# Patient Record
Sex: MALE | Race: BLACK OR AFRICAN AMERICAN | NOT HISPANIC OR LATINO | Employment: OTHER | URBAN - METROPOLITAN AREA
[De-identification: names, ages, dates, MRNs, and addresses within clinical notes are randomized per-mention and may not be internally consistent; named-entity substitution may affect disease eponyms.]

---

## 2020-07-21 ENCOUNTER — HOSPITAL ENCOUNTER (EMERGENCY)
Facility: CLINIC | Age: 30
Discharge: HOME | End: 2020-07-21
Attending: EMERGENCY MEDICINE | Admitting: EMERGENCY MEDICINE
Payer: MEDICAID

## 2020-07-21 VITALS
HEART RATE: 57 BPM | OXYGEN SATURATION: 98 % | SYSTOLIC BLOOD PRESSURE: 143 MMHG | RESPIRATION RATE: 12 BRPM | DIASTOLIC BLOOD PRESSURE: 90 MMHG

## 2020-07-21 DIAGNOSIS — R31.9 HEMATURIA, UNSPECIFIED TYPE: ICD-10-CM

## 2020-07-21 DIAGNOSIS — R36.9 URETHRAL DISCHARGE: ICD-10-CM

## 2020-07-21 DIAGNOSIS — R19.7 DIARRHEA, UNSPECIFIED TYPE: ICD-10-CM

## 2020-07-21 LAB
ALBUMIN UR-MCNC: NEGATIVE MG/DL
APPEARANCE UR: CLEAR
BACTERIA #/AREA URNS HPF: ABNORMAL /HPF
BASOPHILS # BLD AUTO: 0 10E9/L (ref 0–0.2)
BASOPHILS NFR BLD AUTO: 0.3 %
BILIRUB UR QL STRIP: NEGATIVE
COLOR UR AUTO: YELLOW
DIFFERENTIAL METHOD BLD: NORMAL
EOSINOPHIL # BLD AUTO: 0 10E9/L (ref 0–0.7)
EOSINOPHIL NFR BLD AUTO: 0.4 %
ERYTHROCYTE [DISTWIDTH] IN BLOOD BY AUTOMATED COUNT: 13.3 % (ref 10–15)
GLUCOSE UR STRIP-MCNC: NEGATIVE MG/DL
HCT VFR BLD AUTO: 45.8 % (ref 40–53)
HGB BLD-MCNC: 16.1 G/DL (ref 13.3–17.7)
HGB UR QL STRIP: NEGATIVE
IMM GRANULOCYTES # BLD: 0 10E9/L (ref 0–0.4)
IMM GRANULOCYTES NFR BLD: 0.3 %
KETONES UR STRIP-MCNC: NEGATIVE MG/DL
LEUKOCYTE ESTERASE UR QL STRIP: NEGATIVE
LYMPHOCYTES # BLD AUTO: 2.5 10E9/L (ref 0.8–5.3)
LYMPHOCYTES NFR BLD AUTO: 36.8 %
MCH RBC QN AUTO: 31.9 PG (ref 26.5–33)
MCHC RBC AUTO-ENTMCNC: 35.2 G/DL (ref 31.5–36.5)
MCV RBC AUTO: 91 FL (ref 78–100)
MONOCYTES # BLD AUTO: 0.6 10E9/L (ref 0–1.3)
MONOCYTES NFR BLD AUTO: 8.4 %
MUCOUS THREADS #/AREA URNS LPF: PRESENT /LPF
NEUTROPHILS # BLD AUTO: 3.7 10E9/L (ref 1.6–8.3)
NEUTROPHILS NFR BLD AUTO: 53.8 %
NITRATE UR QL: NEGATIVE
NRBC # BLD AUTO: 0 10*3/UL
NRBC BLD AUTO-RTO: 0 /100
PH UR STRIP: 6 PH (ref 5–7)
PLATELET # BLD AUTO: 292 10E9/L (ref 150–450)
RBC # BLD AUTO: 5.04 10E12/L (ref 4.4–5.9)
RBC #/AREA URNS AUTO: <1 /HPF (ref 0–2)
SOURCE: ABNORMAL
SP GR UR STRIP: 1.01 (ref 1–1.03)
SQUAMOUS #/AREA URNS AUTO: <1 /HPF (ref 0–1)
UROBILINOGEN UR STRIP-MCNC: NORMAL MG/DL (ref 0–2)
WBC # BLD AUTO: 6.9 10E9/L (ref 4–11)
WBC #/AREA URNS AUTO: 1 /HPF (ref 0–5)

## 2020-07-21 PROCEDURE — 96372 THER/PROPH/DIAG INJ SC/IM: CPT

## 2020-07-21 PROCEDURE — 25000128 H RX IP 250 OP 636: Performed by: EMERGENCY MEDICINE

## 2020-07-21 PROCEDURE — 36415 COLL VENOUS BLD VENIPUNCTURE: CPT

## 2020-07-21 PROCEDURE — 86780 TREPONEMA PALLIDUM: CPT | Performed by: EMERGENCY MEDICINE

## 2020-07-21 PROCEDURE — 25000125 ZZHC RX 250: Performed by: EMERGENCY MEDICINE

## 2020-07-21 PROCEDURE — 87591 N.GONORRHOEAE DNA AMP PROB: CPT | Performed by: EMERGENCY MEDICINE

## 2020-07-21 PROCEDURE — 85025 COMPLETE CBC W/AUTO DIFF WBC: CPT | Performed by: EMERGENCY MEDICINE

## 2020-07-21 PROCEDURE — 87491 CHLMYD TRACH DNA AMP PROBE: CPT | Performed by: EMERGENCY MEDICINE

## 2020-07-21 PROCEDURE — 99284 EMERGENCY DEPT VISIT MOD MDM: CPT | Mod: 25

## 2020-07-21 PROCEDURE — 86592 SYPHILIS TEST NON-TREP QUAL: CPT | Performed by: EMERGENCY MEDICINE

## 2020-07-21 PROCEDURE — 81001 URINALYSIS AUTO W/SCOPE: CPT | Performed by: EMERGENCY MEDICINE

## 2020-07-21 PROCEDURE — 86593 SYPHILIS TEST NON-TREP QUANT: CPT | Performed by: EMERGENCY MEDICINE

## 2020-07-21 RX ORDER — DOXYCYCLINE 100 MG/1
100 CAPSULE ORAL 2 TIMES DAILY
Qty: 20 CAPSULE | Refills: 0 | Status: SHIPPED | OUTPATIENT
Start: 2020-07-21 | End: 2020-07-31

## 2020-07-21 RX ADMIN — LIDOCAINE HYDROCHLORIDE 250 MG: 10 INJECTION, SOLUTION EPIDURAL; INFILTRATION; INTRACAUDAL; PERINEURAL at 19:52

## 2020-07-21 ASSESSMENT — ENCOUNTER SYMPTOMS
HEMATURIA: 1
DIARRHEA: 1
ABDOMINAL PAIN: 0
BLOOD IN STOOL: 0
FEVER: 0
RECTAL PAIN: 0
APPETITE CHANGE: 0
CHILLS: 0
DYSURIA: 0
NAUSEA: 0
VOMITING: 0
SHORTNESS OF BREATH: 0

## 2020-07-21 NOTE — ED AVS SNAPSHOT
Emergency Department  [address] [place] [id number]  Phone:  [phone]  Fax:  [phone]                                    Kartik Madera   MRN: [id number]    Department:   Emergency Department   Date of Visit:  7/21/2020           After Visit Summary Signature Page    I have received my discharge instructions, and my questions have been answered. I have discussed any challenges I see with this plan with the nurse or doctor.    ..........................................................................................................................................  Patient/Patient Representative Signature      ..........................................................................................................................................  Patient Representative Print Name and Relationship to Patient    ..................................................               ................................................  Date                                   Time    ..........................................................................................................................................  Reviewed by Signature/Title    ...................................................              ..............................................  Date                                               Time          22EPIC Rev 08/18

## 2020-07-22 LAB
C TRACH DNA SPEC QL NAA+PROBE: NEGATIVE
N GONORRHOEA DNA SPEC QL NAA+PROBE: NEGATIVE
RPR SER QL: REACTIVE
RPR SER-TITR: 8 {TITER}
SPECIMEN SOURCE: NORMAL
SPECIMEN SOURCE: NORMAL
T PALLIDUM AB SER QL: REACTIVE

## 2020-07-22 NOTE — ED PROVIDER NOTES
History   Chief Complaint:  Diarrhea    HPI   Kartik Madera is a 30 year old male, who just recently moved to [place] from [place], presenting to the ED for evaluation of diarrhea. The patient reports he had the onset of hematuria and diarrhea within the past 3-4 days. The patient states he had hematuria first, which demonstrated a thick clot like amount of blood coming from the penis. He does also mention some pus like contents as well. He notes he had pain with urination after sex, but has not had any dysuria since then. The patient denies any testicle soreness or buttocks pain. He does mention he has had this once in 2016, but they attributed it to a broken blood vessel. The patient also complains of diarrhea which he has been ongoing for about 2-3 days. He has about 3 diarrheal episodes a day which are water like in nature, but are not black or bloody. He denies any fever, nausea, vomiting, abdominal pain, or shaking chills. He has a good appetite. He has not lost sense of taste or smell. He denies any chest pain or shortness of breath.    Allergies:  No known drug allergies    Medications:    The patient is not currently taking any prescribed medications.    Past Medical History:    History reviewed.  No pertinent past medical history.    Past Surgical History:    History reviewed. No pertinent surgical history.    Family History:    History reviewed. No pertinent family history.     Social History:  Smoking status: Yes  Alcohol use: Yes  Presents to the ED alone    Review of Systems   Constitutional: Negative for appetite change, chills and fever.   Respiratory: Negative for shortness of breath.    Cardiovascular: Negative for chest pain.   Gastrointestinal: Positive for diarrhea. Negative for abdominal pain, blood in stool, nausea, rectal pain and vomiting.   Genitourinary: Positive for discharge and hematuria. Negative for dysuria, penile pain and testicular pain.   All other systems reviewed and are  negative.    Physical Exam     Patient Vitals for the past 24 hrs:   BP Pulse Resp SpO2   07/21/20 2030 (!) 143/90 57 -- 98 %   07/21/20 1905 -- -- 18 --   07/21/20 1903 -- -- -- 97 %   07/21/20 1902 (!) 164/108 98 -- --       Physical Exam  Constitutional: Heavy set black male, supine  HENT: No signs of trauma.   Eyes: EOM are normal. Pupils are equal, round, and reactive to light.   Neck: Normal range of motion. No JVD present. No cervical adenopathy.  Cardiovascular: Regular rhythm.  Exam reveals no gallop and no friction rub.    No murmur heard.  Pulmonary/Chest: Bilateral breath sounds normal. No wheezes, rhonchi or rales.  Abdominal: Soft. No tenderness. No rebound or guarding.   : Circumcised penis. No rash or discharge. Testes descended bilaterally, non tender, no inguinal adenopathy or lesion. Rectal area has no redness or bleeding.  Musculoskeletal: No edema. No tenderness.   Lymphadenopathy: No lymphadenopathy.   Neurological: Alert and oriented to person, place, and time. Normal strength. Coordination normal.   Skin: Skin is warm and dry. No rash noted. No erythema.     Emergency Department Course   Laboratory:  Laboratory findings were communicated with the patient who voiced understanding of the findings.    CBC: WBC 6.9, HGB 16.1,     UA: Bacteria Few, Mucous Present, WBC/HPF <1, RBC/HPF <1    Chlamydia trachomatis PCR: Pending   Neisseria gonorrhoea PCR: Pending  Treponema Abs w Reflex to RPR and Titer: Pending    Interventions:  1952: Rocephin 250 mg IM    Emergency Department Course:  Past medical records, nursing notes, and vitals reviewed.    1909 I performed an exam of the patient as documented above.     IV was inserted and blood was drawn for laboratory testing, results above.  The patient provided a urine sample here in the emergency department. This was sent for laboratory testing, findings above.    2010 I rechecked the patient and discussed the results of his workup thus far.      Findings and plan explained to the Patient. Patient discharged home with instructions regarding supportive care, medications, and reasons to return. The importance of close follow-up was reviewed.    I personally reviewed the laboratory results with the Patient and answered all related questions prior to discharge.     Impression & Plan   Medical Decision Making:  Kartik Madera is a 30 year old male who states about 3-4 days ago he noted some urethral bleeding after having sex. This has been occurring intermittently since. He states he had this happen one time a few years ago and they told him a small blood vessel had broken. He denies any pain on urination, however, when I asked him if he had any discharge from his penis he states yes. He denies any rectal pain other than he has been having loose stools over the past few days which have been water and has been sore when he wipes. He denies any hemorrhoids. He denies any abdominal pain, fever, or chills. On examination, his  exam is unremarkable. Labs were obtained, a CBC was normal, his urine is unremarkable. RPR and urethral swabs for GC and chlamydia were sent. Patient remains comfortable at this time. It is uncertain whether it is truly hematuria or urethral discharge that he is having. Hematology is good and I will treat him for possible urethral infection with a shot of Rocephin and 10 day course of doxycycline. His diarrhea should be treated symptomatically. He has not been around anyone with diarrhea nor has he ingested new foods or medications. I told him the antibiotics could make it worse and he should follow the diarrhea instructions and possible try Pepto-Bismol. It is possible that this is not urethral discharge, but indeed could be hematuria so he would need follow up for that. I referred him to Dr. Quintero and have asked him to make an appointment in the next 2-3 days. If symptoms worsen or recur come to the emergency department. This is unlikely  to be kidney stone or any intraabdominal infection or problem.      Diagnosis:    ICD-10-CM    1. Urethral discharge  R36.9    2. Diarrhea, unspecified type  R19.7    3. Hematuria, unspecified type  R31.9        Disposition:  Discharged to home.    Discharge Medications:  New Prescriptions    DOXYCYCLINE HYCLATE (VIBRAMYCIN) 100 MG CAPSULE    Take 1 capsule (100 mg) by mouth 2 times daily for 10 days       Scribe Disclosure:  Barrett ANDREW, am serving as a scribe at 7:09 PM on 7/21/2020 to document services personally performed by Ramón Mason MD based on my observations and the provider's statements to me.        Ramón Mason MD  07/21/20 2034

## 2020-07-24 ENCOUNTER — VIRTUAL VISIT (OUTPATIENT)
Dept: FAMILY MEDICINE | Facility: CLINIC | Age: 30
End: 2020-07-24
Payer: MEDICAID

## 2020-07-24 ENCOUNTER — TELEPHONE (OUTPATIENT)
Dept: FAMILY MEDICINE | Facility: CLINIC | Age: 30
End: 2020-07-24

## 2020-07-24 DIAGNOSIS — R31.9 HEMATURIA, UNSPECIFIED TYPE: ICD-10-CM

## 2020-07-24 DIAGNOSIS — Z11.3 SCREEN FOR STD (SEXUALLY TRANSMITTED DISEASE): ICD-10-CM

## 2020-07-24 DIAGNOSIS — A53.9 SYPHILIS: Primary | ICD-10-CM

## 2020-07-24 PROCEDURE — 99203 OFFICE O/P NEW LOW 30 MIN: CPT | Mod: GT | Performed by: INTERNAL MEDICINE

## 2020-07-24 PROCEDURE — 99207 ZZC NO CHARGE LOS: CPT | Performed by: INTERNAL MEDICINE

## 2020-07-24 NOTE — TELEPHONE ENCOUNTER
Called and spoke to patient - Nurse Only for Penicillin inj set up for upcoming Monday 27th at 1:30 pm, unable to make Lab appt for Monday, appt set up for Tuesday 28th at 3:15 pm.    Chelsie Tapia MA on 7/24/2020 at 4:53 PM

## 2020-07-24 NOTE — PROGRESS NOTES
"Kartik Madera is a 30 year old male who is being evaluated via a billable video visit.      The patient has been notified of following:     \"This video visit will be conducted via a call between you and your physician/provider. We have found that certain health care needs can be provided without the need for an in-person physical exam.  This service lets us provide the care you need with a video conversation.  If a prescription is necessary we can send it directly to your pharmacy.  If lab work is needed we can place an order for that and you can then stop by our lab to have the test done at a later time.    Video visits are billed at different rates depending on your insurance coverage.  Please reach out to your insurance provider with any questions.    If during the course of the call the physician/provider feels a video visit is not appropriate, you will not be charged for this service.\"    Patient has given verbal consent for Video visit? Yes  How would you like to obtain your AVS? MyChart  If you are dropped from the video visit, the video invite should be resent to: Text to cell phone: [phone]  Will anyone else be joining your video visit? No      Video-Visit Details    Video Start Time: 3:44 pm  Video End Time: 3:53 pm    Originating Location (pt. Location): home    Distant Location (provider location):  [facility]     Platform used for Video Visit: StartX (Am.Well)      HPI    Patient was at ER on 7/21/20 with complaint of urethral discharge. RPR result was pending but has now been completed and is positive along with treponemal antibodies.  Still has hematuria.  Denies fever, abdominal/flank pain, nausea/vomiting.  Denies genital rashes/lesions.      Review of Systems   Constitutional: Negative for chills and fever.   Genitourinary: Negative for penile pain, penile swelling, scrotal swelling and testicular pain.         ICD-10-CM    1. Syphilis  A53.9 penicillin G benzathine " (BICILLIN L-A) injection 2.4 Million Units   2. Hematuria, unspecified type  R31.9 UROLOGY ADULT REFERRAL   3. Screen for STD (sexually transmitted disease)  Z11.3 Hepatitis B Surface Antibody     Hepatitis B surface antigen     Hepatitis C antibody     Herpes Simplex Virus 1 and 2 IgG     HIV Antigen Antibody Combo     **patient advised to inform all his sexual partners of his diagnosis and to abstain from sexual activity until treatment has been completed      Dr. Richard Ding

## 2020-07-24 NOTE — TELEPHONE ENCOUNTER
Patient is coming in on Monday (7/27/20) for a nurse-only visit for Penicillin G injection and will return weekly for another 2 doses (total of 3 doses; already ordered).  I instructed patient to call clinic prior to arrival.    Also needs lab visit on Monday for additional blood tests.

## 2020-07-27 ENCOUNTER — TELEPHONE (OUTPATIENT)
Dept: UROLOGY | Facility: CLINIC | Age: 30
End: 2020-07-27

## 2020-07-27 ENCOUNTER — ALLIED HEALTH/NURSE VISIT (OUTPATIENT)
Dept: NURSING | Facility: CLINIC | Age: 30
End: 2020-07-27
Payer: MEDICAID

## 2020-07-27 DIAGNOSIS — R31.9 HEMATURIA: Primary | ICD-10-CM

## 2020-07-27 DIAGNOSIS — Z23 ENCOUNTER FOR IMMUNIZATION: Primary | ICD-10-CM

## 2020-07-27 DIAGNOSIS — R36.9 URETHRAL DISCHARGE: ICD-10-CM

## 2020-07-27 PROCEDURE — 96372 THER/PROPH/DIAG INJ SC/IM: CPT

## 2020-07-27 PROCEDURE — 99207 ZZC NO CHARGE NURSE ONLY: CPT

## 2020-07-27 NOTE — TELEPHONE ENCOUNTER
[facility] Call Center    Phone Message    May a detailed message be left on voicemail: yes     Reason for Call: Other: Pt is referred by Dr Richard Ding at  to [facility] for Hematuria, unspecified type. Records and referral in [facility] for review, thanks!     Action Taken: Message routed to:  [facility]    Travel Screening: Not Applicable

## 2020-07-27 NOTE — PROGRESS NOTES
Penicillin 2.4 million units given per Dr. Ding in patient's bilateral gluteus cortney.  Patient waited 15 minutes in office before being discharged.  Instructed to come back for the 2 next weeks for additional doses.

## 2020-07-28 DIAGNOSIS — Z11.3 SCREEN FOR STD (SEXUALLY TRANSMITTED DISEASE): ICD-10-CM

## 2020-07-28 PROCEDURE — 36415 COLL VENOUS BLD VENIPUNCTURE: CPT | Performed by: INTERNAL MEDICINE

## 2020-07-28 PROCEDURE — 87389 HIV-1 AG W/HIV-1&-2 AB AG IA: CPT | Performed by: INTERNAL MEDICINE

## 2020-07-28 PROCEDURE — 86803 HEPATITIS C AB TEST: CPT | Performed by: INTERNAL MEDICINE

## 2020-07-28 PROCEDURE — 87340 HEPATITIS B SURFACE AG IA: CPT | Performed by: INTERNAL MEDICINE

## 2020-07-28 PROCEDURE — 86706 HEP B SURFACE ANTIBODY: CPT | Performed by: INTERNAL MEDICINE

## 2020-07-28 PROCEDURE — 86696 HERPES SIMPLEX TYPE 2 TEST: CPT | Performed by: INTERNAL MEDICINE

## 2020-07-28 PROCEDURE — 86695 HERPES SIMPLEX TYPE 1 TEST: CPT | Performed by: INTERNAL MEDICINE

## 2020-07-29 LAB
HBV SURFACE AB SERPL IA-ACNC: 359.9 M[IU]/ML
HBV SURFACE AG SERPL QL IA: NONREACTIVE
HCV AB SERPL QL IA: NONREACTIVE
HIV 1+2 AB+HIV1 P24 AG SERPL QL IA: NONREACTIVE
HSV1 IGG SERPL QL IA: 6.2 AI (ref 0–0.8)
HSV2 IGG SERPL QL IA: <0.2 AI (ref 0–0.8)

## 2020-07-31 NOTE — TELEPHONE ENCOUNTER
Pt does not have hematuria, he has blood in his semen and is being treated for an STI. Dr. Valdez recommended following up with his PCP. He would only need follow up with us if symptoms continue to persist after treatment

## 2020-08-03 ENCOUNTER — ALLIED HEALTH/NURSE VISIT (OUTPATIENT)
Dept: NURSING | Facility: CLINIC | Age: 30
End: 2020-08-03
Payer: MEDICAID

## 2020-08-03 DIAGNOSIS — A64 STD (MALE): Primary | ICD-10-CM

## 2020-08-03 PROCEDURE — 99207 ZZC NO CHARGE NURSE ONLY: CPT

## 2020-08-03 PROCEDURE — 96372 THER/PROPH/DIAG INJ SC/IM: CPT

## 2020-08-03 NOTE — PROGRESS NOTES
Clinic Administered Medication Documentation      Injectable Medication Documentation    Patient was given Penicillin G Benzathine (Bicillin). Prior to medication administration, verified patients identity using patient s name and date of birth. Please see MAR and medication order for additional information. Patient instructed to remain in clinic for 15 minutes and report any adverse reaction to staff immediately .      Was entire vial of medication used? Yes  Vial/Syringe: Single dose vial  Expiration Date:  7-  Was this medication supplied by the patient? No     Shirlene Alcantara MA

## 2020-08-07 ASSESSMENT — ENCOUNTER SYMPTOMS
CHILLS: 0
FEVER: 0

## 2020-08-10 ENCOUNTER — ALLIED HEALTH/NURSE VISIT (OUTPATIENT)
Dept: NURSING | Facility: CLINIC | Age: 30
End: 2020-08-10
Payer: MEDICAID

## 2020-08-10 DIAGNOSIS — Z11.3 SCREEN FOR STD (SEXUALLY TRANSMITTED DISEASE): Primary | ICD-10-CM

## 2020-08-10 PROCEDURE — 96372 THER/PROPH/DIAG INJ SC/IM: CPT

## 2020-08-10 PROCEDURE — 99207 ZZC NO CHARGE NURSE ONLY: CPT

## 2020-08-10 NOTE — PROGRESS NOTES
Clinic Administered Medication Documentation      Injectable Medication Documentation    Patient was given Penicillin G Benzathine (Bicillin). Prior to medication administration, verified patients identity using patient s name and date of birth. Please see MAR and medication order for additional information. Patient instructed to remain in clinic for 15 minutes.      Was entire vial of medication used? Yes  Vial/Syringe: Single dose vial  Expiration Date:  7-  Was this medication supplied by the patient? No     Shirlene Alcantara MA

## 2021-01-04 ENCOUNTER — HEALTH MAINTENANCE LETTER (OUTPATIENT)
Age: 31
End: 2021-01-04

## 2021-10-11 ENCOUNTER — HEALTH MAINTENANCE LETTER (OUTPATIENT)
Age: 31
End: 2021-10-11

## 2022-01-30 ENCOUNTER — HEALTH MAINTENANCE LETTER (OUTPATIENT)
Age: 32
End: 2022-01-30

## 2022-09-24 ENCOUNTER — HEALTH MAINTENANCE LETTER (OUTPATIENT)
Age: 32
End: 2022-09-24

## 2023-01-29 ENCOUNTER — HOSPITAL ENCOUNTER (EMERGENCY)
Facility: HOSPITAL | Age: 33
Discharge: HOME | End: 2023-01-29
Attending: EMERGENCY MEDICINE | Admitting: EMERGENCY MEDICINE
Payer: MEDICAID

## 2023-01-29 VITALS
BODY MASS INDEX: 38.06 KG/M2 | HEART RATE: 62 BPM | HEIGHT: 69 IN | OXYGEN SATURATION: 99 % | DIASTOLIC BLOOD PRESSURE: 97 MMHG | TEMPERATURE: 98.8 F | WEIGHT: 257 LBS | RESPIRATION RATE: 19 BRPM | SYSTOLIC BLOOD PRESSURE: 166 MMHG

## 2023-01-29 DIAGNOSIS — E87.6 HYPOKALEMIA: ICD-10-CM

## 2023-01-29 DIAGNOSIS — R42 LIGHT HEADED: ICD-10-CM

## 2023-01-29 DIAGNOSIS — I10 HYPERTENSION, POOR CONTROL: ICD-10-CM

## 2023-01-29 LAB
ALBUMIN SERPL BCG-MCNC: 4.1 G/DL (ref 3.5–5.2)
ALP SERPL-CCNC: 92 U/L (ref 40–129)
ALT SERPL W P-5'-P-CCNC: 41 U/L (ref 10–50)
ANION GAP SERPL CALCULATED.3IONS-SCNC: 11 MMOL/L (ref 7–15)
AST SERPL W P-5'-P-CCNC: 28 U/L (ref 10–50)
BILIRUB SERPL-MCNC: 0.9 MG/DL
BUN SERPL-MCNC: 10.1 MG/DL (ref 6–20)
CALCIUM SERPL-MCNC: 9.1 MG/DL (ref 8.6–10)
CHLORIDE SERPL-SCNC: 98 MMOL/L (ref 98–107)
CREAT SERPL-MCNC: 1.04 MG/DL (ref 0.67–1.17)
DEPRECATED HCO3 PLAS-SCNC: 28 MMOL/L (ref 22–29)
GFR SERPL CREATININE-BSD FRML MDRD: >90 ML/MIN/1.73M2
GLUCOSE SERPL-MCNC: 82 MG/DL (ref 70–99)
POTASSIUM SERPL-SCNC: 3.3 MMOL/L (ref 3.4–5.3)
PROT SERPL-MCNC: 7.4 G/DL (ref 6.4–8.3)
SODIUM SERPL-SCNC: 137 MMOL/L (ref 136–145)
TROPONIN T SERPL HS-MCNC: 18 NG/L
TROPONIN T SERPL HS-MCNC: 27 NG/L

## 2023-01-29 PROCEDURE — 84484 ASSAY OF TROPONIN QUANT: CPT | Mod: 91 | Performed by: EMERGENCY MEDICINE

## 2023-01-29 PROCEDURE — 80053 COMPREHEN METABOLIC PANEL: CPT | Performed by: EMERGENCY MEDICINE

## 2023-01-29 PROCEDURE — 99285 EMERGENCY DEPT VISIT HI MDM: CPT

## 2023-01-29 PROCEDURE — 36415 COLL VENOUS BLD VENIPUNCTURE: CPT | Performed by: EMERGENCY MEDICINE

## 2023-01-29 PROCEDURE — 250N000013 HC RX MED GY IP 250 OP 250 PS 637: Performed by: EMERGENCY MEDICINE

## 2023-01-29 PROCEDURE — 93005 ELECTROCARDIOGRAM TRACING: CPT | Performed by: EMERGENCY MEDICINE

## 2023-01-29 PROCEDURE — 84484 ASSAY OF TROPONIN QUANT: CPT | Performed by: EMERGENCY MEDICINE

## 2023-01-29 RX ORDER — POTASSIUM CHLORIDE 1500 MG/1
20 TABLET, EXTENDED RELEASE ORAL ONCE
Status: COMPLETED | OUTPATIENT
Start: 2023-01-29 | End: 2023-01-29

## 2023-01-29 RX ORDER — HYDROCORTISONE 2.5 %
CREAM (GRAM) TOPICAL
COMMUNITY
Start: 2022-07-11

## 2023-01-29 RX ORDER — AMLODIPINE BESYLATE 5 MG/1
1 TABLET ORAL
COMMUNITY
Start: 2022-11-03 | End: 2023-01-29

## 2023-01-29 RX ORDER — AMLODIPINE BESYLATE 5 MG/1
5 TABLET ORAL
Qty: 60 TABLET | Refills: 0 | Status: SHIPPED | OUTPATIENT
Start: 2023-01-29

## 2023-01-29 RX ADMIN — POTASSIUM CHLORIDE 20 MEQ: 1500 TABLET, EXTENDED RELEASE ORAL at 03:51

## 2023-01-29 ASSESSMENT — ENCOUNTER SYMPTOMS
DIAPHORESIS: 1
FATIGUE: 1
NAUSEA: 1

## 2023-01-29 ASSESSMENT — ACTIVITIES OF DAILY LIVING (ADL)
ADLS_ACUITY_SCORE: 35
ADLS_ACUITY_SCORE: 35

## 2023-01-29 NOTE — ED TRIAGE NOTES
Patient here for elevated blood pressure. Has not taken prescribed medications for HTN in two weeks since coming here from [place]. States that he was feeling lightheaded tonight after having an argument with SO.      Triage Assessment       Row Name 01/29/23 0149       Triage Assessment (Adult)    Airway WDL WDL       Respiratory WDL    Respiratory WDL WDL       Skin Circulation/Temperature WDL    Skin Circulation/Temperature WDL WDL       Cardiac WDL    Cardiac WDL WDL       Peripheral/Neurovascular WDL    Peripheral Neurovascular WDL WDL       Cognitive/Neuro/Behavioral WDL    Cognitive/Neuro/Behavioral WDL WDL

## 2023-01-29 NOTE — DISCHARGE INSTRUCTIONS
I represcribed you amlodipine you take for your elevated blood pressure.  I created the primary care referral.  Return to the ER for worsening symptoms.

## 2023-01-29 NOTE — ED PROVIDER NOTES
EMERGENCY DEPARTMENT ENCOUNTER      NAME: Kartik Madera  AGE: 32 year old male  YOB: 1990  MRN: [id number]  EVALUATION DATE & TIME: No admission date for patient encounter.    PCP: [facility]    ED PROVIDER: Won Langford MD        Chief Complaint   Patient presents with     Hypertension         FINAL IMPRESSION:  1. Hypokalemia    2. Light headed    3. Hypertension, poor control          ED COURSE & MEDICAL DECISION MAKING:    Pertinent Labs & Imaging studies reviewed. (See chart for details)  32 year old male presents to the Emergency Department for evaluation of feeling lightheaded after argument with wife and smoking marijuana.  Felt very flushed and warm and placed his head in the freezer.  Was worried about his blood pressure.  Has not been on his blood pressure medications for weeks to months    ED Course as of 01/29/23 0554   Sun Jan 29, 2023   0339 32-year-old  withhistory of hypertension presents after he felt sweaty after smoking marijuana and checked his blood pressure and was concerned it was elevated in the 130s and 140s.  Has not been on amlodipine for some time since moving from [place]   0339 Family history of diabetes but no personal history of diabetes.   0339 Currently feels much better but feels tired.   0339 Differential includes symptoms secondary to marijuana, ACS, low blood sugar, electrolyte abnormality, stress, anxiety   0340 PE, aortic dissection unlikely   0340 Will obtain EKG, troponin, CMP   0340 Potassium(!): 3.3  Given oral potassium   0340 Troponin T, High Sensitivity(!): 27  Will obtain delta troponin.  EKG normal sinus rhythm   0341 Risk factors heart disease include elevated blood pressure.   0549 Troponin T, High Sensitivity: 18  ACS unlikely.  EKG shows sinus rhythm.   0553 ACS highly unlikely.  Risk factors include elevated blood pressure.  Recommend starting on amlodipine which she used to be on.  Will refer for primary care follow-up.    0554 Pericarditis, aortic dissection, pulmonary emboli unlikely.  Bilateral breath sounds.  Pneumonia unlikely.   0554 Patient currently has no symptoms on recheck.  Plan for discharge home and close follow-up     2:30 AM I met with the patient, obtained history, performed an initial exam, and discussed options and plan for diagnostics and treatment here in the ED. PPE worn: surgical mask, and nitrile gloves.   5:50 AM Patient feeling better. Results were communicated with the patient. Discussed discharge plans along with return precautions. Patient was agreeable with plan.        Medical Decision Making    History:    Supplemental history from: Documented in chart, if applicable    External Record(s) reviewed: Documented in chart, if applicable.    Work Up:    Chart documentation includes differential considered and any EKGs or imaging independently interpreted by provider, where specified.    In additional to work up documented, I considered the following work up: Documented in chart, if applicable.    External consultation:    Discussion of management with another provider: Documented in chart, if applicable    Complicating factors:    Care impacted by chronic illness: Hypertension and Other: left hemiparesis, seizure, PE    Care affected by social determinants of health: Access to Medical Care    Disposition considerations: Discharge. I prescribed additional prescription strength medication(s) as charted. N/A.            At the conclusion of the encounter I discussed the results of all of the tests and the disposition. The questions were answered. The patient or family acknowledged understanding and was agreeable with the care plan.         MEDICATIONS GIVEN IN THE EMERGENCY:  Medications   potassium chloride ER (KLOR-CON M) CR tablet 20 mEq (20 mEq Oral Given 1/29/23 0351)       NEW PRESCRIPTIONS STARTED AT TODAY'S ER VISIT  New Prescriptions    No medications on file       "    =================================================================    HPI    Triage note  \" \"      Patient information was obtained from: patient     Use of : N/A        Kartik Madera is a 32 year old male with a pertinent history of hypertension, PE, seizure, left hemiparesis, who presents to this ED via walk in for evaluation of hypertension.    Patient had argued with his girlfriend and was sitting smoking marijuana when he suddenly became diaphoretic and nauseous which prompted him to check his blood pressure which happened to be elevated around 140-136 systolically about 2 hours ago. He is prescribed Amlodipine but has not had access to it since he had came here from [place]. He is not diabetic but does have a family history of it. No high cholesterol. He denies abdominal pain. Currently, he is tired. No other medical complaints at this time.       REVIEW OF SYSTEMS   Review of Systems   Constitutional: Positive for diaphoresis (resolved) and fatigue (generalized).   Gastrointestinal: Positive for nausea (resolved).   All other systems reviewed and are negative.       PAST MEDICAL HISTORY:  History reviewed. No pertinent past medical history.    PAST SURGICAL HISTORY:  History reviewed. No pertinent surgical history.        CURRENT MEDICATIONS:    amLODIPine (NORVASC) 5 MG tablet  hydrocortisone 2.5 % cream        ALLERGIES:  No Known Allergies    FAMILY HISTORY:  History reviewed. No pertinent family history.    SOCIAL HISTORY:   Social History     Socioeconomic History     Marital status: Single   Tobacco Use     Smoking status: Every Day     Smokeless tobacco: Never   Substance and Sexual Activity     Alcohol use: Yes     Drug use: Yes     Types: Marijuana     Sexual activity: Yes       VITALS:  BP (!) 166/97   Pulse 62   Temp 98.8  F (37.1  C) (Temporal)   Resp 19   Ht 1.753 m (5' 9\")   Wt 116.6 kg (257 lb)   SpO2 99%   BMI 37.95 kg/m      PHYSICAL EXAM      Vitals: BP (!) 166/97   " "Pulse 62   Temp 98.8  F (37.1  C) (Temporal)   Resp 19   Ht 1.753 m (5' 9\")   Wt 116.6 kg (257 lb)   SpO2 99%   BMI 37.95 kg/m    General: Appears in no acute distress, awake, alert, interactive.  Eyes: Conjunctivae non-injected. Sclera anicteric.  HENT: Atraumatic.  Neck: Supple.  Respiratory/Chest: Respiration unlabored. No wheezes or crackles.  Heart: Regular rate and rhythm  Abdomen: non distended  Musculoskeletal: Normal extremities. No edema or erythema.  Skin: Normal color. No rash or diaphoresis.  Neurologic: Face symmetric, moves all extremities spontaneously. Speech clear.  Psychiatric: Oriented to person, place, and time. Affect appropriate.       LAB:  All pertinent labs reviewed and interpreted.  Results for orders placed or performed during the hospital encounter of 01/29/23   Troponin T, High Sensitivity (now)   Result Value Ref Range    Troponin T, High Sensitivity 27 (H) <=22 ng/L   Comprehensive metabolic panel   Result Value Ref Range    Sodium 137 136 - 145 mmol/L    Potassium 3.3 (L) 3.4 - 5.3 mmol/L    Chloride 98 98 - 107 mmol/L    Carbon Dioxide (CO2) 28 22 - 29 mmol/L    Anion Gap 11 7 - 15 mmol/L    Urea Nitrogen 10.1 6.0 - 20.0 mg/dL    Creatinine 1.04 0.67 - 1.17 mg/dL    Calcium 9.1 8.6 - 10.0 mg/dL    Glucose 82 70 - 99 mg/dL    Alkaline Phosphatase 92 40 - 129 U/L    AST 28 10 - 50 U/L    ALT 41 10 - 50 U/L    Protein Total 7.4 6.4 - 8.3 g/dL    Albumin 4.1 3.5 - 5.2 g/dL    Bilirubin Total 0.9 <=1.2 mg/dL    GFR Estimate >90 >60 mL/min/1.73m2   Result Value Ref Range    Troponin T, High Sensitivity 18 <=22 ng/L       RADIOLOGY:  Reviewed all pertinent imaging. Please see official radiology report.  No orders to display       EKG:    Performed at: 29-JAN-2023 03:14    Impression: sinus bradycardia. Otherwise normal ecg.    Rate: 55  Rhythm: sinus bradycardia  Axis: 54  WA Interval: 140  QRS Interval: 82  QTc Interval: 407  ST Changes: No changes  Comparison: No prior EKGs    I " have independently reviewed and interpreted the EKG(s) documented above.    PROCEDURES:   None      I, Layla Higgins, am serving as a scribe to document services personally performed by Ab Langford MD based on my observation and the provider's statements to me. I, Dr. Ab Langford, attest that Layla Higgins is acting in a scribe capacity, has observed my performance of the services and has documented them in accordance with my direction.    Ab Langford MD  Emergency Medicine  [facility] EMERGENCY DEPARTMENT  [address] [place] [id number]  [phone]     Ab Langford MD  01/29/23 0506

## 2023-05-08 ENCOUNTER — HEALTH MAINTENANCE LETTER (OUTPATIENT)
Age: 33
End: 2023-05-08

## 2024-07-14 ENCOUNTER — HEALTH MAINTENANCE LETTER (OUTPATIENT)
Age: 34
End: 2024-07-14

## 2025-07-19 ENCOUNTER — HEALTH MAINTENANCE LETTER (OUTPATIENT)
Age: 35
End: 2025-07-19